# Patient Record
Sex: FEMALE | Race: ASIAN | Employment: FULL TIME | URBAN - METROPOLITAN AREA
[De-identification: names, ages, dates, MRNs, and addresses within clinical notes are randomized per-mention and may not be internally consistent; named-entity substitution may affect disease eponyms.]

---

## 2022-01-28 ENCOUNTER — LAB (OUTPATIENT)
Dept: FAMILY MEDICINE | Facility: CLINIC | Age: 27
End: 2022-01-28
Payer: COMMERCIAL

## 2022-01-28 DIAGNOSIS — Z20.822 SUSPECTED COVID-19 VIRUS INFECTION: ICD-10-CM

## 2022-01-28 PROCEDURE — U0003 INFECTIOUS AGENT DETECTION BY NUCLEIC ACID (DNA OR RNA); SEVERE ACUTE RESPIRATORY SYNDROME CORONAVIRUS 2 (SARS-COV-2) (CORONAVIRUS DISEASE [COVID-19]), AMPLIFIED PROBE TECHNIQUE, MAKING USE OF HIGH THROUGHPUT TECHNOLOGIES AS DESCRIBED BY CMS-2020-01-R: HCPCS

## 2022-01-28 PROCEDURE — U0005 INFEC AGEN DETEC AMPLI PROBE: HCPCS

## 2022-01-29 LAB — SARS-COV-2 RNA RESP QL NAA+PROBE: NEGATIVE

## 2022-02-06 ENCOUNTER — HEALTH MAINTENANCE LETTER (OUTPATIENT)
Age: 27
End: 2022-02-06

## 2022-10-03 ENCOUNTER — HEALTH MAINTENANCE LETTER (OUTPATIENT)
Age: 27
End: 2022-10-03

## 2023-02-12 ENCOUNTER — HEALTH MAINTENANCE LETTER (OUTPATIENT)
Age: 28
End: 2023-02-12

## 2024-03-10 ENCOUNTER — HEALTH MAINTENANCE LETTER (OUTPATIENT)
Age: 29
End: 2024-03-10

## 2025-07-16 ENCOUNTER — HOSPITAL ENCOUNTER (EMERGENCY)
Facility: HOSPITAL | Age: 30
Discharge: HOME | End: 2025-07-16

## 2025-07-16 ENCOUNTER — APPOINTMENT (OUTPATIENT)
Dept: RADIOLOGY | Facility: HOSPITAL | Age: 30
End: 2025-07-16

## 2025-07-16 VITALS
DIASTOLIC BLOOD PRESSURE: 80 MMHG | RESPIRATION RATE: 18 BRPM | TEMPERATURE: 98.4 F | SYSTOLIC BLOOD PRESSURE: 120 MMHG | OXYGEN SATURATION: 97 % | WEIGHT: 154 LBS | HEART RATE: 62 BPM

## 2025-07-16 DIAGNOSIS — S93.401A SPRAIN OF RIGHT ANKLE, UNSPECIFIED LIGAMENT, INITIAL ENCOUNTER: ICD-10-CM

## 2025-07-16 DIAGNOSIS — M79.671 RIGHT FOOT PAIN: ICD-10-CM

## 2025-07-16 DIAGNOSIS — S80.211A ABRASION, RIGHT KNEE, INITIAL ENCOUNTER: ICD-10-CM

## 2025-07-16 DIAGNOSIS — M25.561 ACUTE PAIN OF RIGHT KNEE: ICD-10-CM

## 2025-07-16 PROCEDURE — 99283 EMERGENCY DEPT VISIT LOW MDM: CPT

## 2025-07-16 PROCEDURE — 73610 X-RAY EXAM OF ANKLE: CPT | Mod: RT

## 2025-07-16 PROCEDURE — 73562 X-RAY EXAM OF KNEE 3: CPT | Mod: RT

## 2025-07-16 PROCEDURE — 73630 X-RAY EXAM OF FOOT: CPT | Mod: RT

## 2025-07-16 ASSESSMENT — COLUMBIA-SUICIDE SEVERITY RATING SCALE - C-SSRS
1. IN THE PAST MONTH, HAVE YOU WISHED YOU WERE DEAD OR WISHED YOU COULD GO TO SLEEP AND NOT WAKE UP?: NO
6. HAVE YOU EVER DONE ANYTHING, STARTED TO DO ANYTHING, OR PREPARED TO DO ANYTHING TO END YOUR LIFE?: NO
2. HAVE YOU ACTUALLY HAD ANY THOUGHTS OF KILLING YOURSELF IN THE PAST MONTH?: NO

## 2025-07-16 NOTE — Clinical Note
Harshal Henderson accompanied Odalis Romero to the emergency department on 7/16/2025. They may return to work on 07/17/2025.      If you have any questions or concerns, please don't hesitate to call.      Elsa Moore PA-C

## 2025-07-16 NOTE — ED TRIAGE NOTES
She twisted her right ankle last evening while walking. She did fall but did not strike her head.

## 2025-07-16 NOTE — Clinical Note
Wandy Romero accompanied Odalis Romero to the emergency department on 7/16/2025. They may return to work on 07/17/2025.      If you have any questions or concerns, please don't hesitate to call.      Elsa Moore PA-C

## 2025-07-16 NOTE — ED PROVIDER NOTES
EMERGENCY DEPARTMENT ENCOUNTER      NAME: Odalis Romero  AGE: 29 year old female  YOB: 1995  MRN: [id number]  EVALUATION DATE & TIME: No admission date for patient encounter.    PCP: No primary care provider on file.    ED PROVIDER: Elsa Moore PA-C      Chief Complaint   Patient presents with    Ankle Pain         FINAL IMPRESSION:  1. Sprain of right ankle, unspecified ligament, initial encounter    2. Right foot pain    3. Acute pain of right knee    4. Abrasion, right knee, initial encounter          ED COURSE & MEDICAL DECISION MAKIN:52 PM Met with patient for initial interview. Plan for care discussed.  1:30 PM Reevaluated and updated patient. I discussed the plan for discharge with the patient, and patient is agreeable. We discussed supportive cares at home and reasons for return to the ER including new or worsening symptoms. All questions and concerns addressed. Patient to be discharged by RN.    29 year old female presents to the Emergency Department for evaluation of right ankle pain s/p mechanical fall yesterday. Upon exam, patient is afebrile, hemodynamically stable, and in no acute distress. Right medial ankle with mild edema, no significant tenderness. Right lateral malleolus and distal 5th metatarsal with tenderness to palpation and mild edema lateral foot. Right knee abrasion, no active bleeding or surrounding cellulitis or abscess. Mild right medial joint line tenderness to palpation with mild edema. Neurovascularly intact. Compartments soft. ROM limited ankle secondary to pain, but 5/5 strength. Differential diagnosis includes but not limited to fracture, sprain, contusion. XR right knee, ankle, and foot negative for acute fracture, but some some soft tissue swelling over the foot and ankle more pronounced laterally per radiology.    Patient was treated with ice upon arrival, patient declined additional analgesia. Symptoms and workup most consistent with sprain and contusion.  Patient was made aware of the above findings. Discussed options with patient and ultimately provided with CAM boot and crutches; patient passed ambulation trial and feels safe/comfortable with discharge home. Plan to discharge patient home with symptomatic management, strict return precautions, and close follow up with their PCP and/or orthopedics for reevaluation and ongoing management - referral placed today. The patient was advised to return to the ER if any new or worsening symptoms develop. The patient verbalizes understanding and agrees with the plan.     MIPS (CTPE, Dental pain, Garcia, Sinusitis, Asthma/COPD, Head Trauma): Not Applicable    SEPSIS: None    MEDICATIONS GIVEN IN THE EMERGENCY:  Medications - No data to display    NEW PRESCRIPTIONS STARTED AT TODAY'S ER VISIT  New Prescriptions    No medications on file          =================================================================    HPI    Patient information was obtained from: patient    Use of : N/A        Odalis Romero is a 29 year old female with no pertinent history who presents to this ED via private car for evaluation of ankle and knee pain. Yesterday, the patient had a fall while gardening. She states that her right foot flipped, twisted, and her entire body fell. She did not hit her head. After the event, she has been experiencing generalized right foot and ankle pain, ankle edema, right knee edema, numbness, and weakness inside her right leg. The patient has not taken any medication for the symptoms and does not want any medications here. There is no pain when she bends her leg. Patient uses crutches at home to get around. She denies any loss of consciousness and she is not on blood thinners.    Per [facility], patients last Tdap was 5/15/2023.    REVIEW OF SYSTEMS   Review of Systems See HPI    PAST MEDICAL HISTORY:  No past medical history on file.    PAST SURGICAL HISTORY:  No past surgical history on file.        CURRENT MEDICATIONS:     No current outpatient medications on file.      ALLERGIES:  No Known Allergies    FAMILY HISTORY:  No family history on file.    SOCIAL HISTORY:   Social History     Socioeconomic History    Marital status: Single     Social Drivers of Health     Food Insecurity: No Food Insecurity (7/19/2023)    Received from [facility]     Worried About Running Out of Food in the Last Year: Never true     Ran Out of Food in the Last Year: Never true       VITALS:  /73   Pulse 80   Temp 98.4  F (36.9  C) (Temporal)   Resp 12   Wt 69.9 kg (154 lb)   SpO2 95%     PHYSICAL EXAM    Constitutional:  Alert, in no acute distress. Cooperative.  EYES: Conjunctivae clear.  HENT:  Atraumatic, normocephalic.  Respiratory:  Respirations even, unlabored, in no acute respiratory distress.  Cardiovascular:  Regular rate, good peripheral perfusion.  GI: Soft, flat, non-distended.  Musculoskeletal: Right lower extremity: Right medial ankle with mild edema, no significant tenderness. Right lateral malleolus and distal 5th metatarsal with tenderness to palpation and mild edema lateral foot. Tenderness to palpation medial joint line knee and overlying abrasion as pictured below. No erythema, warmth, purulence, fluctuance, crepitus, or obvious bony deformity. No obvious joint laxity or effusion. Full ROM knee with minimal reproducible pain. ROM ankle mildly limited secondary to pain. Neurovascularly intact distally. Cap refill <2 seconds. 2+ DP and PT pulses bilaterally. 5/5 strength. Compartments soft.  Integument: Warm, Dry.   Neurologic:  Alert & oriented. No focal deficits noted.  Psych: Normal mood and affect.          LAB:  All pertinent labs reviewed and interpreted.  Results for orders placed or performed during the hospital encounter of 07/16/25   Ankle XR, G/E 3 views, right    Impression    IMPRESSION:      Nonweightbearing views of the right foot and ankle are negative for acute fracture or dislocation.  Normal joint spacing. There is some soft tissue swelling over the foot and ankle more pronounced laterally.   Foot  XR, G/E 3 views, right    Impression    IMPRESSION:      Nonweightbearing views of the right foot and ankle are negative for acute fracture or dislocation. Normal joint spacing. There is some soft tissue swelling over the foot and ankle more pronounced laterally.   XR Knee Right 3 Views    Impression    IMPRESSION:     Normal right knee joint spacing and alignment. No sizable effusion. No displaced fracture.       RADIOLOGY:  Reviewed all pertinent imaging. Please see official radiology report.  XR Knee Right 3 Views   Final Result   IMPRESSION:       Normal right knee joint spacing and alignment. No sizable effusion. No displaced fracture.      Foot  XR, G/E 3 views, right   Final Result   IMPRESSION:        Nonweightbearing views of the right foot and ankle are negative for acute fracture or dislocation. Normal joint spacing. There is some soft tissue swelling over the foot and ankle more pronounced laterally.      Ankle XR, G/E 3 views, right   Final Result   IMPRESSION:        Nonweightbearing views of the right foot and ankle are negative for acute fracture or dislocation. Normal joint spacing. There is some soft tissue swelling over the foot and ankle more pronounced laterally.        I, Margarita Romero, am serving as a scribe to document services personally performed by lEsa Moore PA-C based on my observation and the provider's statements to me. I, Elsa Moore PA-C, attest that aMrgarita Romero is acting in a scribe capacity, has observed my performance of the services and has documented them in accordance with my direction.    Elsa Moore PA-C  [facility] EMERGENCY DEPARTMENT  [address] [place] [id number]  [phone]      Elsa Moore PA-C  07/16/25 [id number]

## 2025-07-16 NOTE — DISCHARGE INSTRUCTIONS
Rest, ice/heat, compression with ACE wrap, walking boot and crutches, elevate your extremity, and increase activity as tolerates. You may use topical Icy Hot or Lidoderm as needed. You may use ibuprofen for swelling/pain and Tylenol as needed for pain.    Tylenol (Acetaminophen) Discharge Instructions:  You may take over-the-counter, Tylenol (acetaminophen) every 4-6 hours as needed for pain.  Take no more than 4000 mg of Tylenol in a 24-hour period for adults, maximum dosing for children is based on weight - please look at medication label.    Avoid taking more than 1 acetaminophen-containing product at a time and be aware that many over-the-counter medications contain a combination of acetaminophen and other products.  If you are taking Tylenol in addition to a combination product please keep track of your daily acetaminophen dose to make sure you do not exceed the recommended 4000 mg.  Taking too much acetaminophen can cause permanent damage to your liver.    Ibuprofen/Naproxen Discharge Instructions:  You may take ibuprofen (600 mg) every 6 hours as needed for pain control.  The maximum dose of (ibuprofen is 3200 mg ) in a 24-hour period for adults, maximum dosing for children is based on weight - please look at medication label.     Take this medication with food to prevent stomach irritation.  With long-term use this medication can irritate the stomach causing pain and lead to development of a stomach ulcer.  If you notice stomach pain or vomiting of coffee-ground colored vomit or blood, please be seen by a healthcare provider.  Attempt to use this medication for the shortest time possible.      Follow-up with your primary care provider and/or orthopedics for reevaluation and possible additional imaging, joint steroid injection, and/or physical therapy referral.     Return to the emergency department for any new or worsening symptoms including increased pain, redness/warmth/drainage/swelling, fever/chills, new  weakness, numbness/tingling, decreased range of motion, cold extremity, or any other concerning symptoms.     Take Care!  - Elsa Moore PA-C

## 2025-07-20 ENCOUNTER — HEALTH MAINTENANCE LETTER (OUTPATIENT)
Age: 30
End: 2025-07-20